# Patient Record
Sex: FEMALE | Employment: OTHER | URBAN - METROPOLITAN AREA
[De-identification: names, ages, dates, MRNs, and addresses within clinical notes are randomized per-mention and may not be internally consistent; named-entity substitution may affect disease eponyms.]

---

## 2019-11-08 ENCOUNTER — HOSPITAL ENCOUNTER (OUTPATIENT)
Dept: LAB | Age: 69
Discharge: HOME | End: 2019-11-08

## 2019-11-08 PROCEDURE — 88312 SPECIAL STAINS GROUP 1: CPT

## 2019-11-08 PROCEDURE — 88305 TISSUE EXAM BY PATHOLOGIST: CPT

## 2020-02-03 ENCOUNTER — TELEPHONE (OUTPATIENT)
Dept: NUTRITION | Age: 70
End: 2020-02-03

## 2020-02-03 NOTE — TELEPHONE ENCOUNTER
Nutrition Counseling: Contacted pt regarding referral. See notes documented in Nutrition Counseling Referral for details. No further follow-up contact from pt. Will close referral for this office and notify referring provider.     [facility]  [phone]

## 2020-02-07 ENCOUNTER — HOSPITAL ENCOUNTER (OUTPATIENT)
Dept: GENERAL RADIOLOGY | Age: 70
Discharge: HOME | End: 2020-02-07
Attending: INTERNAL MEDICINE
Payer: MEDICARE

## 2020-02-07 VITALS — HEIGHT: 61 IN | BODY MASS INDEX: 21.52 KG/M2 | WEIGHT: 114 LBS

## 2020-02-07 DIAGNOSIS — R07.89 ATYPICAL CHEST PAIN: ICD-10-CM

## 2020-02-07 DIAGNOSIS — K21.9 GASTROESOPHAGEAL REFLUX DISEASE WITHOUT ESOPHAGITIS: ICD-10-CM

## 2020-02-07 PROCEDURE — 74011000255 HC RX REV CODE- 255: Performed by: INTERNAL MEDICINE

## 2020-02-07 PROCEDURE — 74220 X-RAY XM ESOPHAGUS 1CNTRST: CPT

## 2020-02-07 RX ADMIN — BARIUM SULFATE 710 ML: 0.6 SUSPENSION ORAL at 09:38

## 2021-06-04 ENCOUNTER — HOSPITAL ENCOUNTER (OUTPATIENT)
Dept: NUTRITION | Age: 71
Discharge: HOME | End: 2021-06-04
Payer: SELF-PAY

## 2021-06-04 ENCOUNTER — HOSPITAL ENCOUNTER (OUTPATIENT)
Dept: NUTRITION | Age: 71
End: 2021-06-04

## 2021-06-04 VITALS — BODY MASS INDEX: 21.54 KG/M2 | HEIGHT: 61 IN

## 2021-06-04 PROCEDURE — 97802 MEDICAL NUTRITION INDIV IN: CPT

## 2021-06-04 NOTE — PROGRESS NOTES
Aracely Daily, was evaluated through a synchronous (real-time) audio-video encounter. The patient (or guardian if applicable) is aware that this is a billable service. Verbal consent to proceed has been obtained within the past 12 months. The visit was conducted pursuant to the emergency declaration under the [address] and the [employer] and [employer] Act. Patient identification was verified, and a caregiver was present when appropriate. The patient was located in a state where the provider was credentialed to provide care. --Feli Goldman RD on 6/4/2021 at 11:49 AM    An electronic signature was used to authenticate this note. 01 [facility] Outpatient Nutrition Counseling  ASSESSMENT  Pt is a 78 y.o. female referred with the following diagnosis (es):  Gastro-esophageal reflux disease without esophagitis [K21.9]   PMH: RA, HTN    Referral notes state pt also with excessive gas, abdominal pain, GERD, change in bowel habits. Treated for severe daily reflux symptoms s/p treatment for vaginal and oral candidiasis (antifungals and magic mouthwash). 2019 EGD negative for H. Pylori, esophageal biopsies negative for valdez's esophagus. She underwent esophageal manometry demonstrating hypertensive lower esophageal sphincter with slowly compete relaxation but normal motility. PH testing revealed significant reflux but no acid reflux. Barium swallow 2/7/20 showed minimal gasteroesophgeal reflux without other anatomic abnormality and mild esophageal dysmotility. She reports no improvement on carafate TID and describes symptoms as material coming up in her esophagus when she talks. She feels both pantoprazole and omeprazole made reflux symptoms worse in the past. She manages her constipation by eating prunes and drinking the water the prunes are boiled in.      Pt states she was told she could eat whatever she could tolerate, but isnt sure what she can/should eat. She states she has a good appetite, but has been losing weight. She notes a 10# wt loss since December 2020: states she weighed 112#, CBW reported: 101#. Barriers to change: nutrition related knowledge deficit, food aversions    Social Support for making lifestyle changes:  present during appointment today    Diet Recall:   Breakfast- bowl of fruit, oatmeal made with oatmilk, 1 slice of bread  Lunch- greek yogurt, banana, 1 boiled egg white;  Dinner- steamed vegetables (cauliflower, cabbage), lentils, rice, chicken, quinoa, salmon. She tries to eat 4-5x per day, but sometimes forgets to eat when she gets busy or anxious. Has been consuming part of an [employer] brand nutrition shake-mixes 1/3 of it in with oatmeal. She cites chicken nuggets, garlic, salty food, sweet food, toasted bread, greek yogurt (sometimes), corn, walnuts, and peanut butter all making reflux worse. She was told not to eat red meat by GI. Stopped eating chocolate, decaf coffee, sugar, salt, and orange juice. Physical Activity: unknown    Sleep Habits: unknown    Behaviors indicate current stage of change is: Preparation. Labs: unavailable     Meds: famotidine, carafate, elavil    Anthropometrics:   Estimated body mass index is 19.1 kg/m² as calculated from the following:    Height as of this encounter: 5' 1\" (1.549 m). Weight as of 6/4/21: 45.9 kg (101 lb). Estimated Nutrition Needs:  MSJ= 925.51 kcal/d  EEN for weight gain = (MSJ +/- 10% for margin of error) x 1.2 PAL + 250kcal/day  1249-1470kcal/d  (1300kcal/d)  Protein: 65g/day (20%)    Carbohydrate: 162g/day (50%)    Fat: 43g/day (30%)     DIAGNOSIS:  Unintentional weight loss related to inadequate kcal/protein intake as evidenced by reported 10# wt loss, underweight BMI, diet recall appears inadequate in protein. INTERVENTIONS:  1. 60 minute appointment    2.  Nutrition Prescription  Recommended Modifications to Meals and Snacks:   - 1300kcal diet  - Consume 4-5 meals/snacks per day  - Add protein food to each meal/snack  - Continue to avoid GERD trigger foods    3. Nutrition Education and Counseling   Encouraged small, frequent meals (eating 4-6x per day)   Encouraged keeping snacks readily available- provided examples of CHO + PRO snacks.  Reviewed food sources of protein, strategies for adding protein to meals and snacks, and easier to chew protein sources. Encouraged consuming a protein food with each meal/snack.  Encouraged addition of [employer] as an alternative to Ensure.  Reviewed GERD MNT guidelines and lifestyle interventions. Explained that pt has implemented all guidelines and at this time. Pt verbalized understanding of recommendations discussed. Anticipate fair compliance with recommendations. Goal: Pt will structure meals and change food choices to facilitate weight gain by follow up appointment date. Action Steps:           1. Consume 4-5 small meals per day- set alarm to remind her. 2. Consume protein food with each meal/snack. 3. Continue to avoid GERD trigger foods.       MONITORING & EVALUATION:  Follow Up: 7/9/21 @ [address] [place] [address], ANNIE, KIKE  Outpatient Dietitian  W: [phone]

## 2021-07-09 ENCOUNTER — APPOINTMENT (OUTPATIENT)
Dept: NUTRITION | Age: 71
End: 2021-07-09

## 2021-10-12 ENCOUNTER — DOCUMENTATION ONLY (OUTPATIENT)
Dept: NUTRITION | Age: 71
End: 2021-10-12